# Patient Record
Sex: FEMALE | URBAN - METROPOLITAN AREA
[De-identification: names, ages, dates, MRNs, and addresses within clinical notes are randomized per-mention and may not be internally consistent; named-entity substitution may affect disease eponyms.]

---

## 2021-11-12 ENCOUNTER — OFFICE (OUTPATIENT)
Dept: URBAN - METROPOLITAN AREA CLINIC 75 | Facility: CLINIC | Age: 31
End: 2021-11-12

## 2021-11-12 VITALS
DIASTOLIC BLOOD PRESSURE: 62 MMHG | SYSTOLIC BLOOD PRESSURE: 122 MMHG | WEIGHT: 125 LBS | HEIGHT: 68 IN | HEART RATE: 68 BPM | RESPIRATION RATE: 18 BRPM

## 2021-11-12 DIAGNOSIS — R14.0 ABDOMINAL DISTENSION (GASEOUS): ICD-10-CM

## 2021-11-12 DIAGNOSIS — R10.9 UNSPECIFIED ABDOMINAL PAIN: ICD-10-CM

## 2021-11-12 DIAGNOSIS — E80.4 GILBERT SYNDROME: ICD-10-CM

## 2021-11-12 DIAGNOSIS — E80.7 DISORDER OF BILIRUBIN METABOLISM, UNSPECIFIED: ICD-10-CM

## 2021-11-12 DIAGNOSIS — R68.81 EARLY SATIETY: ICD-10-CM

## 2021-11-12 PROCEDURE — 99244 OFF/OP CNSLTJ NEW/EST MOD 40: CPT | Performed by: INTERNAL MEDICINE

## 2024-09-11 ENCOUNTER — OFFICE VISIT (OUTPATIENT)
Dept: OBSTETRICS AND GYNECOLOGY | Facility: CLINIC | Age: 34
End: 2024-09-11
Payer: COMMERCIAL

## 2024-09-11 VITALS
HEIGHT: 67 IN | DIASTOLIC BLOOD PRESSURE: 72 MMHG | SYSTOLIC BLOOD PRESSURE: 110 MMHG | WEIGHT: 123 LBS | BODY MASS INDEX: 19.3 KG/M2

## 2024-09-11 DIAGNOSIS — Z13.89 SCREENING FOR GENITOURINARY CONDITION: ICD-10-CM

## 2024-09-11 DIAGNOSIS — Z11.51 SCREENING FOR HUMAN PAPILLOMAVIRUS: ICD-10-CM

## 2024-09-11 DIAGNOSIS — Z30.432 ENCOUNTER FOR IUD REMOVAL: ICD-10-CM

## 2024-09-11 DIAGNOSIS — Z01.419 CERVICAL SMEAR, AS PART OF ROUTINE GYNECOLOGICAL EXAMINATION: ICD-10-CM

## 2024-09-11 DIAGNOSIS — Z01.419 WELL WOMAN EXAM WITH ROUTINE GYNECOLOGICAL EXAM: Primary | ICD-10-CM

## 2024-09-11 LAB
B-HCG UR QL: NEGATIVE
BILIRUB BLD-MCNC: NEGATIVE MG/DL
CLARITY, POC: CLEAR
COLOR UR: ABNORMAL
EXPIRATION DATE: NORMAL
GLUCOSE UR STRIP-MCNC: NEGATIVE MG/DL
INTERNAL NEGATIVE CONTROL: NORMAL
INTERNAL POSITIVE CONTROL: NORMAL
KETONES UR QL: NEGATIVE
LEUKOCYTE EST, POC: NEGATIVE
Lab: NORMAL
NITRITE UR-MCNC: NEGATIVE MG/ML
PH UR: 6 [PH] (ref 5–8)
PROT UR STRIP-MCNC: ABNORMAL MG/DL
RBC # UR STRIP: ABNORMAL /UL
SP GR UR: 1 (ref 1–1.03)
UROBILINOGEN UR QL: NORMAL

## 2024-09-11 PROCEDURE — 58301 REMOVE INTRAUTERINE DEVICE: CPT | Performed by: NURSE PRACTITIONER

## 2024-09-11 PROCEDURE — 81025 URINE PREGNANCY TEST: CPT | Performed by: NURSE PRACTITIONER

## 2024-09-11 PROCEDURE — 99385 PREV VISIT NEW AGE 18-39: CPT | Performed by: NURSE PRACTITIONER

## 2024-09-11 PROCEDURE — 81002 URINALYSIS NONAUTO W/O SCOPE: CPT | Performed by: NURSE PRACTITIONER

## 2024-09-17 LAB
CYTOLOGIST CVX/VAG CYTO: NORMAL
CYTOLOGY CVX/VAG DOC CYTO: NORMAL
CYTOLOGY CVX/VAG DOC THIN PREP: NORMAL
DX ICD CODE: NORMAL
HPV I/H RISK 4 DNA CVX QL PROBE+SIG AMP: NEGATIVE
Lab: NORMAL
OTHER STN SPEC: NORMAL
PATHOLOGIST CVX/VAG CYTO: NORMAL
STAT OF ADQ CVX/VAG CYTO-IMP: NORMAL

## 2024-09-23 PROBLEM — Z01.419 WELL WOMAN EXAM WITH ROUTINE GYNECOLOGICAL EXAM: Status: ACTIVE | Noted: 2024-09-23

## 2024-09-23 PROBLEM — Z30.432 ENCOUNTER FOR IUD REMOVAL: Status: ACTIVE | Noted: 2024-09-23

## 2025-03-31 ENCOUNTER — OFFICE VISIT (OUTPATIENT)
Dept: OBSTETRICS AND GYNECOLOGY | Facility: CLINIC | Age: 35
End: 2025-03-31
Payer: COMMERCIAL

## 2025-03-31 VITALS
HEIGHT: 67 IN | DIASTOLIC BLOOD PRESSURE: 64 MMHG | WEIGHT: 130 LBS | BODY MASS INDEX: 20.4 KG/M2 | SYSTOLIC BLOOD PRESSURE: 102 MMHG

## 2025-03-31 DIAGNOSIS — N92.6 MISSED MENSES: ICD-10-CM

## 2025-03-31 DIAGNOSIS — Z32.01 POSITIVE URINE PREGNANCY TEST: Primary | ICD-10-CM

## 2025-03-31 DIAGNOSIS — O20.9 BLEEDING IN EARLY PREGNANCY: ICD-10-CM

## 2025-03-31 DIAGNOSIS — O20.8 SUBCHORIONIC HEMORRHAGE IN FIRST TRIMESTER: ICD-10-CM

## 2025-03-31 LAB
B-HCG UR QL: POSITIVE
BILIRUB BLD-MCNC: NEGATIVE MG/DL
CLARITY, POC: CLEAR
COLOR UR: YELLOW
EXPIRATION DATE: ABNORMAL
GLUCOSE UR STRIP-MCNC: NEGATIVE MG/DL
INTERNAL NEGATIVE CONTROL: ABNORMAL
INTERNAL POSITIVE CONTROL: ABNORMAL
KETONES UR QL: NEGATIVE
LEUKOCYTE EST, POC: NEGATIVE
Lab: ABNORMAL
NITRITE UR-MCNC: NEGATIVE MG/ML
PH UR: 5 [PH] (ref 5–8)
PROT UR STRIP-MCNC: NEGATIVE MG/DL
RBC # UR STRIP: NEGATIVE /UL
SP GR UR: 1.03 (ref 1–1.03)
UROBILINOGEN UR QL: NORMAL

## 2025-03-31 PROCEDURE — 81002 URINALYSIS NONAUTO W/O SCOPE: CPT | Performed by: NURSE PRACTITIONER

## 2025-03-31 PROCEDURE — 81025 URINE PREGNANCY TEST: CPT | Performed by: NURSE PRACTITIONER

## 2025-03-31 PROCEDURE — 99213 OFFICE O/P EST LOW 20 MIN: CPT | Performed by: NURSE PRACTITIONER

## 2025-03-31 RX ORDER — PRENATAL VIT NO.126/IRON/FOLIC 28MG-0.8MG
TABLET ORAL DAILY
COMMUNITY

## 2025-03-31 NOTE — PROGRESS NOTES
Confirmation of pregnancy     Chief Complaint   Patient presents with    Amenorrhea         Nyasia Lwoe is being seen today for evaluation of absence of menses. Due to her period being late, she tested for pregnancy and had + home UPT. She is a 34 y.o. . This problem is new to me, the examiner.       OB History          1    Para   0    Term   0       0    AB   0    Living   0         SAB   0    IAB   0    Ectopic   0    Molar   0    Multiple   0    Live Births   0                LNMP: 25  Confident with date: Yes  Taking prenatal vitamins: Yes. Needs RX: No  Planned pregnancy: Yes  Prior obstetric issues, potential pregnancy concerns:   Family history of genetic issues (includes FOB): denies   Varicella Hx: disease as child  Flu vaccine: did not get   COVID 19 vaccine: S/P vaccine. Booster vaccine: x1   History of STDs: denies   Current medications: PNV   Last pap smear:   Smoker: No  Drug or alcohol abuse: No  H/O physical, emotional or sexual abuse:denies  H/O mental health disorder: denies   Prior testing for Cystic Fibrosis Carrier or Sickle Cell Trait- has not had   Prepregnancy BMI - Body mass index is 20.36 kg/m².    No past medical history on file.    Past Surgical History:   Procedure Laterality Date    KNEE ACL RECONSTRUCTION      WISDOM TOOTH EXTRACTION           Current Outpatient Medications:     prenatal vitamin (prenatal, CLASSIC, vitamin) tablet, Take  by mouth Daily., Disp: , Rfl:     No Known Allergies    Social History     Socioeconomic History    Marital status:    Tobacco Use    Smoking status: Never   Substance and Sexual Activity    Alcohol use: Yes     Alcohol/week: 7.0 standard drinks of alcohol     Types: 2 Glasses of wine, 5 Cans of beer per week    Drug use: Never    Sexual activity: Yes     Partners: Male     Birth control/protection: I.U.D.     Comment: Want to get IUD removed       Family History   Problem Relation Age of Onset    Breast  "cancer Maternal Aunt     Colon cancer Maternal Aunt     Breast cancer Maternal Aunt     Colon cancer Maternal Aunt     Ovarian cancer Neg Hx     Uterine cancer Neg Hx        Review of systems     Review of Systems   Constitutional:  Positive for fatigue.   Genitourinary:  Positive for menstrual problem.   Musculoskeletal:  Positive for back pain.       Objective    /64   Ht 170.2 cm (67\")   Wt 59 kg (130 lb)   LMP 02/16/2025   BMI 20.36 kg/m²     Physical Exam  Vitals and nursing note reviewed.   Constitutional:       Appearance: Normal appearance.   Musculoskeletal:         General: Normal range of motion.   Skin:     General: Skin is warm and dry.   Neurological:      General: No focal deficit present.      Mental Status: She is alert and oriented to person, place, and time.   Psychiatric:         Mood and Affect: Mood normal.         Behavior: Behavior normal.         Assessment/Plan      Missed menses: + UPT in office. LNMP 2/16/25 = 6.1 week EGA with an EDC=11/23/25.  Oriented to practice. US today shows a single, viable IUP @ 6.1 weeks. PIO is seen near the GS measuring 1.91 x 0.82cm. Both ovaries wnl.  bpm.      Pregnancy: Disc importance of regular prenatal care. Enc PNV daily. Counseled on providers and on call phone. Disc Tylenol products are ok and encouraged no ibuprofen or ASA in pregnancy.  Disc exercise in pregnancy, diet, expected weight gain, etc. Enc no use of tobacco, vaping, drugs, or alcohol during pregnancy. Rev warn s/s of SAB.     Labs: Pt counseled on genetic screening, Quad screen, AFP, and NIPS.     Body mass index is 20.36 kg/m². For women with a normal weight, with a BMI between 18.5-24.5   before pregnancy, the recommended weight gain is 25-35 pounds for the entire pregnancy     Smoker- non smoker     6.   COVID19 - S/P vaccine and booster.    7.  Flu vaccine - Declined     8. PIO- Noted on US today. Blood noted on US probe. Check ABO/RH and antibody screen. Enc pelvic " rest. Repeat US next visit     All questions answered.     Encounter Diagnoses   Name Primary?    Missed menses Yes       Diagnoses and all orders for this visit:    Missed menses  -     POC Urinalysis Dipstick  -     POC Pregnancy, Urine    Other orders  -     prenatal vitamin (prenatal, CLASSIC, vitamin) tablet; Take  by mouth Daily.        RTO in 2 weeks for new OB exam, labs and US- [facility]     EMILY Wilkinson  3/31/2025  10:52 EDT

## 2025-04-01 LAB
ABO GROUP BLD: NORMAL
BLD GP AB SCN SERPL QL: NEGATIVE
RH BLD: NORMAL

## 2025-04-02 ENCOUNTER — CLINICAL SUPPORT (OUTPATIENT)
Dept: OBSTETRICS AND GYNECOLOGY | Facility: CLINIC | Age: 35
End: 2025-04-02
Payer: COMMERCIAL

## 2025-04-02 DIAGNOSIS — O26.899 RH NEGATIVE, ANTEPARTUM: Primary | ICD-10-CM

## 2025-04-02 DIAGNOSIS — Z67.91 RH NEGATIVE, ANTEPARTUM: Primary | ICD-10-CM

## 2025-04-14 ENCOUNTER — INITIAL PRENATAL (OUTPATIENT)
Dept: OBSTETRICS AND GYNECOLOGY | Facility: CLINIC | Age: 35
End: 2025-04-14
Payer: COMMERCIAL

## 2025-04-14 VITALS — BODY MASS INDEX: 20.61 KG/M2 | WEIGHT: 131.6 LBS | SYSTOLIC BLOOD PRESSURE: 112 MMHG | DIASTOLIC BLOOD PRESSURE: 60 MMHG

## 2025-04-14 DIAGNOSIS — Z34.00 INITIAL OBSTETRIC VISIT, ANTEPARTUM: Primary | ICD-10-CM

## 2025-04-14 DIAGNOSIS — Z36.9 ENCOUNTER FOR ANTENATAL SCREENING, UNSPECIFIED: ICD-10-CM

## 2025-04-14 DIAGNOSIS — Z67.91 RH D NEGATIVE BLOOD TYPE: ICD-10-CM

## 2025-04-14 DIAGNOSIS — O20.8 SUBCHORIONIC HEMORRHAGE IN FIRST TRIMESTER: ICD-10-CM

## 2025-04-14 PROBLEM — Z30.432 ENCOUNTER FOR IUD REMOVAL: Status: RESOLVED | Noted: 2024-09-23 | Resolved: 2025-04-14

## 2025-04-14 PROBLEM — Z01.419 WELL WOMAN EXAM WITH ROUTINE GYNECOLOGICAL EXAM: Status: RESOLVED | Noted: 2024-09-23 | Resolved: 2025-04-14

## 2025-04-14 LAB
BILIRUB BLD-MCNC: NEGATIVE MG/DL
CLARITY, POC: CLEAR
COLOR UR: YELLOW
GLUCOSE UR STRIP-MCNC: NEGATIVE MG/DL
KETONES UR QL: NEGATIVE
LEUKOCYTE EST, POC: ABNORMAL
NITRITE UR-MCNC: NEGATIVE MG/ML
PH UR: 5 [PH] (ref 5–8)
PROT UR STRIP-MCNC: NEGATIVE MG/DL
RBC # UR STRIP: ABNORMAL /UL
SP GR UR: 1.01 (ref 1–1.03)
UROBILINOGEN UR QL: ABNORMAL

## 2025-04-14 PROCEDURE — 0501F PRENATAL FLOW SHEET: CPT | Performed by: NURSE PRACTITIONER

## 2025-04-14 NOTE — PROGRESS NOTES
Initial OB Visit     Chief Complaint   Patient presents with    Initial Prenatal Visit       Nyasia Lowe is being seen today for her first obstetrical visit.  She is a 34 y.o.    8w1d gestation. This problem is new to me: yes      OB History          1    Para   0    Term   0       0    AB   0    Living   0         SAB   0    IAB   0    Ectopic   0    Molar   0    Multiple   0    Live Births   0                LNMP: 2025  Confident with date: Yes  Taking prenatal vitamins: Yes  Planned pregnancy: Yes  Prior obstetric issues, potential pregnancy concerns: G1  Family history of genetic issues (includes FOB): no  Prior infections concerning in pregnancy (Rash, fever in last 2 weeks): no  Varicella Hx: yes  Flu vaccine: no  COVID Vaccine: yes and booster  History of STDs: no  Current medications: PNV  Last pap smear: 2024  Smoker: No  Drug or alcohol abuse: No  H/O Physical, mental or sexual abuse: no  H/O mental health disorder: no  Prior testing for Cystic Fibrosis Carrier or Sickle Cell Trait- no  Prepregnancy BMI: Body mass index is 20.61 kg/m².      History reviewed. No pertinent past medical history.    Past Surgical History:   Procedure Laterality Date    KNEE ACL RECONSTRUCTION      WISDOM TOOTH EXTRACTION           Current Outpatient Medications:     prenatal vitamin (prenatal, CLASSIC, vitamin) tablet, Take  by mouth Daily., Disp: , Rfl:     No Known Allergies    Social History     Socioeconomic History    Marital status:    Tobacco Use    Smoking status: Never    Smokeless tobacco: Never   Vaping Use    Vaping status: Never Used   Substance and Sexual Activity    Alcohol use: Yes     Alcohol/week: 7.0 standard drinks of alcohol     Types: 2 Glasses of wine, 5 Cans of beer per week    Drug use: Never    Sexual activity: Yes     Partners: Male       Family History   Problem Relation Age of Onset    Breast cancer Maternal Aunt     Colon cancer Maternal Aunt     Breast  cancer Maternal Aunt     Colon cancer Maternal Aunt     Ovarian cancer Neg Hx     Uterine cancer Neg Hx        Review of systems     All systems reviewed and are negative      Objective    /60   Wt 59.7 kg (131 lb 9.6 oz)   LMP 02/16/2025   BMI 20.61 kg/m²     General Appearance:    Alert, cooperative, in no acute distress, habitus normal   Head:    Normocephalic, without obvious abnormality, atraumatic   Neck:   supple   Breast Exam:    Deferred   Abdomen:     Deferred   Genitalia:    Deferred   Extremities:   Moves all extremities well, no edema, no cyanosis, no     redness   Skin:   No bleeding, bruising or rash   Neurologic:   Sensation intact, A&O times 3         Assessment/Plan    1) Pregnancy at 8w1d- US today: FHR 171bpm. OV WNL.  PIO santhosh 1.5x1.2x0.6cm.  US findings discussed with pt/partner. EDC established 11/23/2025 and confirmed by LNMP/US.     2) OB exam: OB exam completed: Yes. New OB bag provided Yes. Pap collected: No; she is UTD. Provided list of safe medications to take while in pregnancy.    3) Labs: OB labs collected: Yes. Counseled on genetic carrier screening (CF/SMA/FX): Yes; she desires.  Counseled on Panorama/NIPS: Yes, she desires at her next appt. Counseled on Quad/AFP: No, she is too early.    4) BMI is within normal parameters. No other follow-up for BMI required.       5)  Prenatal care: Oriented to the office and prenatal care. Encourage prenatal vitamins. Disc Tylenol products are fine, avoid aspirin and ibuprofen; Zika (travel restrictions/ok to use insect repellant); not to change cat litter; food restrictions; exercise; avoidance of alcohol, tobacco, drugs and saunas/hot tubs.     6) S/p Covid vaccine: yes and booster; S/p Flu vaccine: no    7)  PIO- Repeat US today- PIO santhosh 1.5x1.2x0.6cm.  Compared to previous US on 3/31/25- santhosh 1.91x0.82cm.  Continues to have spotting.  Reviewed warning signs.  She is Rh- weak D; received Rhogam on 4/2/2025.  Continue pelvic rest.   Repeat US at next OB visit.        All questions answered.     RTO 4 weeks OBT, Panorama, US-PIO    Parts of this document have been copied or forwarded from her previous visits and have been reviewed, updated and edited as indicated.      Lisa Sanz, APRN    4/14/2025  10:34 EDT

## 2025-04-15 PROBLEM — D64.9: Status: ACTIVE | Noted: 2025-04-15

## 2025-04-15 PROBLEM — D58.2 HIGH BLOOD HEMOGLOBIN F: Status: ACTIVE | Noted: 2025-04-15

## 2025-04-15 LAB
ABO GROUP BLD: NORMAL
BASOPHILS # BLD AUTO: 0 X10E3/UL (ref 0–0.2)
BASOPHILS NFR BLD AUTO: 0 %
BLD GP AB SCN SERPL QL: NORMAL
BLD GP AB SCN SERPL QL: POSITIVE
BLOOD GROUP ANTIBODIES SERPL: ABNORMAL
EOSINOPHIL # BLD AUTO: 0.1 X10E3/UL (ref 0–0.4)
EOSINOPHIL NFR BLD AUTO: 1 %
ERYTHROCYTE [DISTWIDTH] IN BLOOD BY AUTOMATED COUNT: 12.3 % (ref 11.7–15.4)
HBA1C MFR BLD: 4.6 % (ref 4.8–5.6)
HBV SURFACE AG SERPL QL IA: NEGATIVE
HCT VFR BLD AUTO: 39.6 % (ref 34–46.6)
HCV IGG SERPL QL IA: NON REACTIVE
HGB A MFR BLD ELPH: 94.7 % (ref 96.4–98.8)
HGB A2 MFR BLD ELPH: 2.4 % (ref 1.8–3.2)
HGB BLD-MCNC: 13.4 G/DL (ref 11.1–15.9)
HGB F MFR BLD ELPH: 2.9 % (ref 0–2)
HGB FRACT BLD-IMP: ABNORMAL
HGB S MFR BLD ELPH: 0 %
HIV 1+2 AB+HIV1 P24 AG SERPL QL IA: NON REACTIVE
IMM GRANULOCYTES # BLD AUTO: 0 X10E3/UL (ref 0–0.1)
IMM GRANULOCYTES NFR BLD AUTO: 0 %
LYMPHOCYTES # BLD AUTO: 1.5 X10E3/UL (ref 0.7–3.1)
LYMPHOCYTES NFR BLD AUTO: 16 %
MCH RBC QN AUTO: 32.8 PG (ref 26.6–33)
MCHC RBC AUTO-ENTMCNC: 33.8 G/DL (ref 31.5–35.7)
MCV RBC AUTO: 97 FL (ref 79–97)
MONOCYTES # BLD AUTO: 0.5 X10E3/UL (ref 0.1–0.9)
MONOCYTES NFR BLD AUTO: 5 %
NEUTROPHILS # BLD AUTO: 7.1 X10E3/UL (ref 1.4–7)
NEUTROPHILS NFR BLD AUTO: 78 %
PLATELET # BLD AUTO: 229 X10E3/UL (ref 150–450)
RBC # BLD AUTO: 4.08 X10E6/UL (ref 3.77–5.28)
RH BLD: NORMAL
RPR SER QL: NON REACTIVE
RUBV IGG SERPL IA-ACNC: 1.2 INDEX
VZV IGG SER QL IA: REACTIVE
WBC # BLD AUTO: 9.3 X10E3/UL (ref 3.4–10.8)
XXX BLOOD GROUP AB TITR SERPL AHG: ABNORMAL {TITER}

## 2025-04-16 LAB
AMPHETAMINES UR QL SCN: NEGATIVE NG/ML
BACTERIA UR CULT: NORMAL
BACTERIA UR CULT: NORMAL
BARBITURATES UR QL SCN: NEGATIVE NG/ML
BENZODIAZ UR QL SCN: NEGATIVE NG/ML
BUPRENORPHINE UR QL: NEGATIVE NG/ML
BZE UR QL SCN: NEGATIVE NG/ML
C TRACH RRNA SPEC QL NAA+PROBE: NEGATIVE
CANNABINOIDS UR QL SCN: NEGATIVE NG/ML
CREAT UR-MCNC: 13.3 MG/DL (ref 20–300)
FENTANYL UR-MCNC: NEGATIVE PG/ML
LABORATORY COMMENT REPORT: ABNORMAL
MEPERIDINE UR QL: NEGATIVE NG/ML
METHADONE UR QL SCN: NEGATIVE NG/ML
N GONORRHOEA RRNA SPEC QL NAA+PROBE: NEGATIVE
OPIATES UR QL SCN: NEGATIVE NG/ML
OXYCODONE+OXYMORPHONE UR QL SCN: NEGATIVE NG/ML
PCP UR QL: NEGATIVE NG/ML
PH UR: 6.4 [PH] (ref 4.5–8.9)
PROPOXYPH UR QL SCN: NEGATIVE NG/ML
SP GR UR: 1
T VAGINALIS RRNA SPEC QL NAA+PROBE: NEGATIVE
TRAMADOL UR QL SCN: NEGATIVE NG/ML

## 2025-04-24 LAB
Lab: NEGATIVE
Lab: NORMAL
NTRA CYSTIC FIBROSIS: NEGATIVE
NTRA FRAGILE X SYNDROME: NEGATIVE
NTRA SPINAL MUSCULAR ATROPHY: NEGATIVE

## 2025-05-13 ENCOUNTER — ROUTINE PRENATAL (OUTPATIENT)
Dept: OBSTETRICS AND GYNECOLOGY | Facility: CLINIC | Age: 35
End: 2025-05-13
Payer: COMMERCIAL

## 2025-05-13 VITALS — DIASTOLIC BLOOD PRESSURE: 64 MMHG | BODY MASS INDEX: 20.99 KG/M2 | WEIGHT: 134 LBS | SYSTOLIC BLOOD PRESSURE: 108 MMHG

## 2025-05-13 DIAGNOSIS — Z71.85 IMMUNIZATION COUNSELING: ICD-10-CM

## 2025-05-13 DIAGNOSIS — D64.9 LOW BLOOD HEMOGLOBIN A: ICD-10-CM

## 2025-05-13 DIAGNOSIS — Z67.91 RH D NEGATIVE BLOOD TYPE: ICD-10-CM

## 2025-05-13 DIAGNOSIS — D58.2 HIGH BLOOD HEMOGLOBIN F: ICD-10-CM

## 2025-05-13 DIAGNOSIS — O20.8 SUBCHORIONIC HEMORRHAGE IN FIRST TRIMESTER: ICD-10-CM

## 2025-05-13 DIAGNOSIS — O20.9 BLEEDING IN EARLY PREGNANCY: Primary | ICD-10-CM

## 2025-05-13 DIAGNOSIS — Z36.9 ENCOUNTER FOR ANTENATAL SCREENING, UNSPECIFIED: ICD-10-CM

## 2025-05-13 PROBLEM — Z34.90 PREGNANCY: Status: ACTIVE | Noted: 2025-05-13

## 2025-05-13 PROBLEM — Z32.01 POSITIVE URINE PREGNANCY TEST: Status: RESOLVED | Noted: 2025-03-31 | Resolved: 2025-05-13

## 2025-05-13 LAB
BILIRUB BLD-MCNC: NEGATIVE MG/DL
CLARITY, POC: CLEAR
COLOR UR: YELLOW
GLUCOSE UR STRIP-MCNC: NEGATIVE MG/DL
KETONES UR QL: NEGATIVE
LEUKOCYTE EST, POC: NEGATIVE
NITRITE UR-MCNC: NEGATIVE MG/ML
PH UR: 5 [PH] (ref 5–8)
PROT UR STRIP-MCNC: NEGATIVE MG/DL
RBC # UR STRIP: NEGATIVE /UL
SP GR UR: 1.03 (ref 1–1.03)
UROBILINOGEN UR QL: NORMAL

## 2025-05-13 NOTE — PROGRESS NOTES
Ob follow up      Nyasia Lowe is a 34 y.o.  12w2d patient being seen today for her obstetrical visit. Patient reports no complaints. No VB or spotting.  Fetal movement:  Not yet  .      ROS - Denies leaking fluid, vaginal bleeding and notes good fetal movement.     /64   Wt 60.8 kg (134 lb)   LMP 2025   BMI 20.99 kg/m²       Vitals: VSS; AF    General Appearance:  Awake. Alert. Well developed. Well nourished. In no acute distress.    Visual Inspection: ° Abdomen was normal on visual inspection.  Palpation: ° Abdomen was soft. ° Abdominal non-tender.    Uterus: ° Fundal height was normal for gestational age. ° Not tender.  Uterine Adnexae: ° Normal without masses or tenderness.  Neurological:  ° Oriented to time, place, and person.  Skin:  ° General appearance was normal. No bruising or ecchymosis.  Obstetrical: +FCA on US     Viable IUP 13+5 wga     FCA: 150's   PIO seen 1.66 x 0.55 cm           Ultrasound images and report reviewed personally by me.          Dameon Layne, DO    A/P      Diagnoses and all orders for this visit:    1. Bleeding in early pregnancy (Primary)    2. Subchorionic hemorrhage in first trimester    3. Low blood hemoglobin A- referred to hematology    4. High blood hemoglobin F- referred to hematology  Overview:  Heme Appt [id number]      5. Rh D negative blood type- weak D  Overview:  Rhogam 28 wga ( )     Females of child bearing potential   with unknown weak D genotype are candidates for Rh immune globulin   administration (Obstet Gynecol. 2017 Aug;130 )      6. Immunization counseling  Overview:  S/p Covid vaccine: yes and booster; S/p Flu vaccine: no  Tdap 27-37 wga ( )       7. Encounter for  screening, unspecified  -     POC Urinalysis Dipstick      F/u 4 weeks   OB, US ( [facility]) AFP      I confirm that all copied/forwarded documentation in this record has been carefully reviewed, updated as necessary, and accurately reflects the patient's current status  and plan of care.        Dameon Layne, DO     5/13/2025  08:58 EDT

## 2025-05-14 ENCOUNTER — CONSULT (OUTPATIENT)
Dept: ONCOLOGY | Facility: CLINIC | Age: 35
End: 2025-05-14
Payer: COMMERCIAL

## 2025-05-14 ENCOUNTER — LAB (OUTPATIENT)
Dept: LAB | Facility: HOSPITAL | Age: 35
End: 2025-05-14
Payer: COMMERCIAL

## 2025-05-14 ENCOUNTER — PATIENT ROUNDING (BHMG ONLY) (OUTPATIENT)
Dept: ONCOLOGY | Facility: CLINIC | Age: 35
End: 2025-05-14
Payer: COMMERCIAL

## 2025-05-14 VITALS
OXYGEN SATURATION: 100 % | SYSTOLIC BLOOD PRESSURE: 105 MMHG | HEIGHT: 67 IN | DIASTOLIC BLOOD PRESSURE: 69 MMHG | BODY MASS INDEX: 21.25 KG/M2 | RESPIRATION RATE: 16 BRPM | HEART RATE: 71 BPM | WEIGHT: 135.4 LBS | TEMPERATURE: 98.3 F

## 2025-05-14 DIAGNOSIS — D58.2 HIGH BLOOD HEMOGLOBIN F: Primary | ICD-10-CM

## 2025-05-14 DIAGNOSIS — D64.9 LOW BLOOD HEMOGLOBIN A: Primary | ICD-10-CM

## 2025-05-14 LAB
BASOPHILS # BLD AUTO: 0.02 10*3/MM3 (ref 0–0.2)
BASOPHILS NFR BLD AUTO: 0.2 % (ref 0–1.5)
DEPRECATED RDW RBC AUTO: 42.3 FL (ref 37–54)
EOSINOPHIL # BLD AUTO: 0.11 10*3/MM3 (ref 0–0.4)
EOSINOPHIL NFR BLD AUTO: 1 % (ref 0.3–6.2)
ERYTHROCYTE [DISTWIDTH] IN BLOOD BY AUTOMATED COUNT: 12.3 % (ref 12.3–15.4)
HCT VFR BLD AUTO: 37.5 % (ref 34–46.6)
HGB BLD-MCNC: 13.3 G/DL (ref 12–15.9)
IMM GRANULOCYTES # BLD AUTO: 0.03 10*3/MM3 (ref 0–0.05)
IMM GRANULOCYTES NFR BLD AUTO: 0.3 % (ref 0–0.5)
LYMPHOCYTES # BLD AUTO: 1.57 10*3/MM3 (ref 0.7–3.1)
LYMPHOCYTES NFR BLD AUTO: 14.3 % (ref 19.6–45.3)
MCH RBC QN AUTO: 33.1 PG (ref 26.6–33)
MCHC RBC AUTO-ENTMCNC: 35.5 G/DL (ref 31.5–35.7)
MCV RBC AUTO: 93.3 FL (ref 79–97)
MONOCYTES # BLD AUTO: 0.56 10*3/MM3 (ref 0.1–0.9)
MONOCYTES NFR BLD AUTO: 5.1 % (ref 5–12)
NEUTROPHILS NFR BLD AUTO: 79.1 % (ref 42.7–76)
NEUTROPHILS NFR BLD AUTO: 8.71 10*3/MM3 (ref 1.7–7)
PLATELET # BLD AUTO: 191 10*3/MM3 (ref 140–450)
PMV BLD AUTO: 9.8 FL (ref 6–12)
RBC # BLD AUTO: 4.02 10*6/MM3 (ref 3.77–5.28)
WBC NRBC COR # BLD AUTO: 11 10*3/MM3 (ref 3.4–10.8)

## 2025-05-14 PROCEDURE — 85025 COMPLETE CBC W/AUTO DIFF WBC: CPT | Performed by: INTERNAL MEDICINE

## 2025-05-14 NOTE — PROGRESS NOTES
Subjective     REASON FOR CONSULTATION: Abnormal hemoglobin electrophoresis  Provide an opinion on any further workup or treatment                             REQUESTING PRACTITIONER:  Yvon    RECORDS OBTAINED:  Records of the patients history including those obtained from the referring provider were reviewed and summarized in detail.    HISTORY OF PRESENT ILLNESS:  The patient is a 34 y.o. year old female who is here for an opinion about the above issue.    History of Present Illness   This is a pleasant 34-year-old lady currently 12+ weeks gestation with her first pregnancy referred from OB for review of an abnormal hemoglobin electrophoresis.  The patient denies history of anemia, family history of anemia or personal/family history of transfusion support needs except for an elderly grandfather.    Patient had a hemoglobin electrophoresis performed as part of her routine pregnancy care on 4/14/2025 which showed mild elevation of hemoglobin F2 0.9% with subsequent mild decreased hemoglobin A 96.4% normal hemoglobin A 2 2.4% no hemoglobin S.    Review of the patient's CBCs show long-term normal hemoglobin and Red cell indices.    Past Medical History:   Diagnosis Date    Asthma     Depression         Past Surgical History:   Procedure Laterality Date    KNEE ACL RECONSTRUCTION      WISDOM TOOTH EXTRACTION  2009        Current Outpatient Medications on File Prior to Visit   Medication Sig Dispense Refill    prenatal vitamin (prenatal, CLASSIC, vitamin) tablet Take  by mouth Daily.       No current facility-administered medications on file prior to visit.        ALLERGIES:  No Known Allergies     Social History     Socioeconomic History    Marital status:    Tobacco Use    Smoking status: Never    Smokeless tobacco: Never   Vaping Use    Vaping status: Never Used   Substance and Sexual Activity    Alcohol use: Yes     Alcohol/week: 7.0 standard drinks of alcohol     Types: 2 Glasses of wine, 5 Cans of beer  "per week    Drug use: Never    Sexual activity: Yes     Partners: Male        Family History   Problem Relation Age of Onset    Heart disease Father     Arthritis Father     Breast cancer Maternal Aunt     Colon cancer Maternal Aunt     Breast cancer Maternal Aunt     Colon cancer Maternal Aunt     Colon cancer Paternal Grandfather     Ovarian cancer Neg Hx     Uterine cancer Neg Hx         Review of Systems   Constitutional: Negative.    HENT: Negative.     Respiratory: Negative.     Cardiovascular: Negative.    Gastrointestinal: Negative.    Genitourinary: Negative.    Musculoskeletal: Negative.    Allergic/Immunologic: Negative.    Neurological: Negative.    Hematological: Negative.    Psychiatric/Behavioral: Negative.            Objective     Vitals:    05/14/25 1110   BP: 105/69   Pulse: 71   Resp: 16   Temp: 98.3 °F (36.8 °C)   TempSrc: Infrared   SpO2: 100%   Weight: 61.4 kg (135 lb 6.4 oz)   Height: 170.2 cm (67.01\")   PainSc: 0-No pain         5/14/2025    11:28 AM   Current Status   ECOG score 0       Physical Exam    CONSTITUTIONAL: pleasant well-developed adult woman  HEENT: no icterus, no thrush, moist membranes  CV: RRR, S1S2, no murmur  RESP: cta bilat, no wheezing, no rales  GI: soft, nontender, no splenomegaly, +BS  MUSC: no edema, normal gait  NEURO: alert and oriented x3, normal strength  PSYCH: normal mood and affect     RECENT LABS:  Hematology WBC   Date Value Ref Range Status   05/14/2025 11.00 (H) 3.40 - 10.80 10*3/mm3 Final   04/14/2025 9.3 3.4 - 10.8 x10E3/uL Final   02/17/2023 5.91 4.5 - 11.0 10*3/uL Final     RBC   Date Value Ref Range Status   05/14/2025 4.02 3.77 - 5.28 10*6/mm3 Final   04/14/2025 4.08 3.77 - 5.28 x10E6/uL Final   02/17/2023 4.08 4.0 - 5.2 10*6/uL Final     Hemoglobin   Date Value Ref Range Status   05/14/2025 13.3 12.0 - 15.9 g/dL Final   02/17/2023 13.4 12.0 - 16.0 g/dL Final     Hematocrit   Date Value Ref Range Status   05/14/2025 37.5 34.0 - 46.6 % Final "   02/17/2023 38.5 36.0 - 46.0 % Final     Platelets   Date Value Ref Range Status   05/14/2025 191 140 - 450 10*3/mm3 Final   02/17/2023 197 140 - 440 10*3/uL Final          Assessment & Plan     This is a pleasant 34-year-old woman who has minimally elevated hemoglobin F2 0.9% with subsequent mild decrease in the hemoglobin a 94.7% normal hemoglobin A2 and hemoglobin S.  She has no anemia and normocytic normochromic red blood cells.  She has never required blood transfusion.  She has no known family history of hemoglobinopathies.  I discussed with the patient this most likely represents persistence of the hemoglobin after gene or a side effect of her pregnancy, suspect the first.  This typically has no clinical consequence and the patient was reassured.  I will see her back in hematology as needed.

## 2025-05-17 LAB
CFDNA.FET/CFDNA.TOTAL SFR FETUS: NORMAL %
CITATION REF LAB TEST: NORMAL
FET 13+18+21+X+Y ANEUP PLAS.CFDNA: NEGATIVE
FET CHR 21 TS PLAS.CFDNA QL: NEGATIVE
FET SEX PLAS.CFDNA DOSAGE CFDNA: NORMAL
FET TS 13 RISK PLAS.CFDNA QL: NEGATIVE
FET TS 18 RISK WBC.DNA+CFDNA QL: NEGATIVE
GA EST FROM CONCEPTION DATE: NORMAL D
GESTATIONAL AGE > 9:: YES
LAB DIRECTOR NAME PROVIDER: NORMAL
LAB DIRECTOR NAME PROVIDER: NORMAL
LABORATORY COMMENT REPORT: NORMAL
LIMITATIONS OF THE TEST: NORMAL
NEGATIVE PREDICTIVE VALUE: NORMAL
PERFORMANCE CHARACTERISTICS: NORMAL
POSITIVE PREDICTIVE VALUE: NORMAL
REF LAB TEST METHOD: NORMAL
SERVICE CMNT-IMP: NORMAL
TEST PERFORMANCE INFO SPEC: NORMAL

## 2025-06-09 ENCOUNTER — ROUTINE PRENATAL (OUTPATIENT)
Dept: OBSTETRICS AND GYNECOLOGY | Facility: CLINIC | Age: 35
End: 2025-06-09
Payer: COMMERCIAL

## 2025-06-09 VITALS — DIASTOLIC BLOOD PRESSURE: 62 MMHG | WEIGHT: 139 LBS | SYSTOLIC BLOOD PRESSURE: 102 MMHG | BODY MASS INDEX: 21.77 KG/M2

## 2025-06-09 DIAGNOSIS — O43.119 ANTEPARTUM PLACENTA CIRCUMVALLATA: ICD-10-CM

## 2025-06-09 DIAGNOSIS — Z71.85 IMMUNIZATION COUNSELING: ICD-10-CM

## 2025-06-09 DIAGNOSIS — D64.9 LOW BLOOD HEMOGLOBIN A: ICD-10-CM

## 2025-06-09 DIAGNOSIS — O20.8 SUBCHORIONIC HEMORRHAGE IN FIRST TRIMESTER: ICD-10-CM

## 2025-06-09 DIAGNOSIS — Z3A.16 16 WEEKS GESTATION OF PREGNANCY: ICD-10-CM

## 2025-06-09 DIAGNOSIS — Z67.91 RH D NEGATIVE BLOOD TYPE: ICD-10-CM

## 2025-06-09 DIAGNOSIS — Z36.9 ENCOUNTER FOR ANTENATAL SCREENING, UNSPECIFIED: Primary | ICD-10-CM

## 2025-06-09 DIAGNOSIS — D58.2 HIGH BLOOD HEMOGLOBIN F: ICD-10-CM

## 2025-06-09 LAB
BILIRUB BLD-MCNC: NEGATIVE MG/DL
CLARITY, POC: CLEAR
COLOR UR: NORMAL
GLUCOSE UR STRIP-MCNC: NEGATIVE MG/DL
KETONES UR QL: NEGATIVE
LEUKOCYTE EST, POC: NEGATIVE
NITRITE UR-MCNC: NEGATIVE MG/ML
PH UR: 5 [PH] (ref 5–8)
PROT UR STRIP-MCNC: NEGATIVE MG/DL
RBC # UR STRIP: NEGATIVE /UL
SP GR UR: 1.02 (ref 1–1.03)
UROBILINOGEN UR QL: NORMAL

## 2025-06-09 NOTE — PROGRESS NOTES
Ob follow up      Nyasia Lowe is a 34 y.o.  16w1d patient being seen today for her obstetrical visit. Patient reports no complaints. Fetal movement: normal.      ROS - Denies leaking fluid, vaginal bleeding and notes good fetal movement.     /62   Wt 63 kg (139 lb)   LMP 2025   BMI 21.77 kg/m²       Vitals: VSS; AF    General Appearance:  Awake. Alert. Well developed. Well nourished. In no acute distress.    Visual Inspection: ° Abdomen was normal on visual inspection.  Palpation: ° Abdomen was soft. ° Abdominal non-tender.    Uterus: ° Fundal height was normal for gestational age. ° Not tender.  Uterine Adnexae: ° Normal without masses or tenderness.  Neurological:  ° Oriented to time, place, and person.  Skin:  ° General appearance was normal. No bruising or ecchymosis.  Obstetrical: +FCA     Presentation: Transverse   Placenta: Posterior ( Circumvallate placenta? ) PIO absent    MVP 5.4 cm   FCA: 140's            Ultrasound images and report reviewed personally by me.          Dameon Layne, DO     A/P      Diagnoses and all orders for this visit:    1. Encounter for  screening, unspecified (Primary)  -     POC Urinalysis Dipstick  -     Alpha Fetoprotein, Maternal    2. Subchorionic hemorrhage RESOLVED    3. 16 weeks gestation of pregnancy  Overview:  CF/FX/SMA neg   NIPT Low risk; Male   AFP pending       4. Low blood hemoglobin A- referred to hematology    5. High blood hemoglobin F- referred to hematology  Overview:  Heme Appt     Heme:   minimally elevated hemoglobin F2 0.9% with subsequent mild decrease in the hemoglobin a 94.7% normal hemoglobin A2 and hemoglobin S.   She has no anemia and normocytic normochromic red blood cells. She has never required blood transfusion.   She has no known family history of hemoglobinopathies. I discussed with the patient this most likely represents persistence of the hemoglobin after gene or a side effect of her pregnancy, suspect  the first.   This typically has no clinical consequence and the patient was reassured. ( May 2025)       6. Rh D negative blood type- weak D  Overview:  Rhogam 28 wga ( )     Females of child bearing potential   with unknown weak D genotype are candidates for Rh immune globulin   administration (Obstet Gynecol. 2017 Aug;130 )      7. Immunization counseling  Overview:  S/p Covid vaccine: yes and booster; S/p Flu vaccine: no  Tdap 27-37 wga ( )       8. Antepartum placenta circumvallata  Overview:  Growth 28, 32, 36 wga ( )         F/u 4 weeks, OB , Anatomy     I confirm that all copied/forwarded documentation in this record has been carefully reviewed, updated as necessary, and accurately reflects the patient's current status and plan of care.        Dameon Layne DO     6/9/2025  09:16 EDT

## 2025-06-11 LAB
AFP INTERP SERPL-IMP: NORMAL
AFP INTERP SERPL-IMP: NORMAL
AFP MOM SERPL: 1.15
AFP SERPL-MCNC: 40.5 NG/ML
AGE AT DELIVERY: 34.1 YR
GA METHOD: NORMAL
GA: 16.1 WEEKS
IDDM PATIENT QL: NO
LABORATORY COMMENT REPORT: NORMAL
MULTIPLE PREGNANCY: NO
NEURAL TUBE DEFECT RISK FETUS: 7603 %
RESULT: NORMAL

## 2025-07-08 ENCOUNTER — ROUTINE PRENATAL (OUTPATIENT)
Dept: OBSTETRICS AND GYNECOLOGY | Facility: CLINIC | Age: 35
End: 2025-07-08
Payer: COMMERCIAL

## 2025-07-08 VITALS — SYSTOLIC BLOOD PRESSURE: 102 MMHG | BODY MASS INDEX: 21.77 KG/M2 | WEIGHT: 139 LBS | DIASTOLIC BLOOD PRESSURE: 62 MMHG

## 2025-07-08 DIAGNOSIS — D58.2 HIGH BLOOD HEMOGLOBIN F: ICD-10-CM

## 2025-07-08 DIAGNOSIS — Z3A.20 20 WEEKS GESTATION OF PREGNANCY: ICD-10-CM

## 2025-07-08 DIAGNOSIS — Z36.9 ENCOUNTER FOR ANTENATAL SCREENING, UNSPECIFIED: Primary | ICD-10-CM

## 2025-07-08 DIAGNOSIS — D64.9 LOW BLOOD HEMOGLOBIN A: ICD-10-CM

## 2025-07-08 DIAGNOSIS — Z67.91 RH D NEGATIVE BLOOD TYPE: ICD-10-CM

## 2025-07-08 DIAGNOSIS — O20.8 SUBCHORIONIC HEMORRHAGE IN FIRST TRIMESTER: ICD-10-CM

## 2025-07-08 DIAGNOSIS — Z71.85 IMMUNIZATION COUNSELING: ICD-10-CM

## 2025-07-08 PROBLEM — O20.9 BLEEDING IN EARLY PREGNANCY: Status: RESOLVED | Noted: 2025-03-31 | Resolved: 2025-07-08

## 2025-07-08 PROBLEM — O43.119 CIRCUMVALLATE PLACENTA: Status: RESOLVED | Noted: 2025-06-09 | Resolved: 2025-07-08

## 2025-07-08 LAB
BILIRUB BLD-MCNC: NEGATIVE MG/DL
CLARITY, POC: CLEAR
COLOR UR: YELLOW
GLUCOSE UR STRIP-MCNC: NEGATIVE MG/DL
KETONES UR QL: ABNORMAL
LEUKOCYTE EST, POC: NEGATIVE
NITRITE UR-MCNC: NEGATIVE MG/ML
PH UR: 5 [PH] (ref 5–8)
PROT UR STRIP-MCNC: ABNORMAL MG/DL
RBC # UR STRIP: ABNORMAL /UL
SP GR UR: 1.01 (ref 1–1.03)
UROBILINOGEN UR QL: NORMAL

## 2025-07-08 NOTE — PROGRESS NOTES
Ob follow up      Nyasia Lowe is a 34 y.o.  20w2d patient being seen today for her obstetrical visit. Patient reports asks about placenta . Fetal movement: normal.      ROS - Denies leaking fluid, vaginal bleeding and notes good fetal movement.     /62   Wt 63 kg (139 lb)   LMP 2025   BMI 21.77 kg/m²       Vitals: VSS; AF    General Appearance:  Awake. Alert. Well developed. Well nourished. In no acute distress.    Visual Inspection: ° Abdomen was normal on visual inspection.  Palpation: ° Abdomen was soft. ° Abdominal non-tender.    Uterus: ° Fundal height was normal for gestational age. ° Not tender.  Uterine Adnexae: ° Normal without masses or tenderness.  Neurological:  ° Oriented to time, place, and person.  Skin:  ° General appearance was normal. No bruising or ecchymosis.  Obstetrical:    Presentation: Breech  Placenta: Posterior  MVP 4.2 cm   FCA: 150's  Cervical length: 4.1 cm   Anatomy scan normal         Ultrasound images and report reviewed personally by me.          Dameon Layne, DO     A/P      Diagnoses and all orders for this visit:    1. Encounter for  screening, unspecified (Primary)  -     POC Urinalysis Dipstick    2. Subchorionic hemorrhage RESOLVED    3. 20 weeks gestation of pregnancy  Overview:  CF/FX/SMA neg   NIPT Low risk; Male   AFP pending       4. Low blood hemoglobin A- referred to hematology    5. High blood hemoglobin F- referred to hematology  Overview:  Heme Appt     Heme:   minimally elevated hemoglobin F2 0.9% with subsequent mild decrease in the hemoglobin a 94.7% normal hemoglobin A2 and hemoglobin S.   She has no anemia and normocytic normochromic red blood cells. She has never required blood transfusion.   She has no known family history of hemoglobinopathies. I discussed with the patient this most likely represents persistence of the hemoglobin after gene or a side effect of her pregnancy, suspect the first.   This typically has no  clinical consequence and the patient was reassured. ( May 2025)       6. Rh D negative blood type- weak D  Overview:  Rhogam 28 wga ( )     Females of child bearing potential   with unknown weak D genotype are candidates for Rh immune globulin   administration (Obstet Gynecol. 2017 Aug;130 )      7. Immunization counseling  Overview:  S/p Covid vaccine: yes and booster; S/p Flu vaccine: no  Tdap 27-37 wga ( )             I confirm that all copied/forwarded documentation in this record has been carefully reviewed, updated as necessary, and accurately reflects the patient's current status and plan of care.        Dameon Layne DO     7/8/2025  08:58 EDT

## 2025-08-06 ENCOUNTER — ROUTINE PRENATAL (OUTPATIENT)
Dept: OBSTETRICS AND GYNECOLOGY | Facility: CLINIC | Age: 35
End: 2025-08-06
Payer: COMMERCIAL

## 2025-08-06 VITALS — DIASTOLIC BLOOD PRESSURE: 60 MMHG | BODY MASS INDEX: 22.39 KG/M2 | WEIGHT: 143 LBS | SYSTOLIC BLOOD PRESSURE: 100 MMHG

## 2025-08-06 DIAGNOSIS — Z36.9 ENCOUNTER FOR ANTENATAL SCREENING, UNSPECIFIED: ICD-10-CM

## 2025-08-06 DIAGNOSIS — Z67.91 RH D NEGATIVE BLOOD TYPE: ICD-10-CM

## 2025-08-06 DIAGNOSIS — Z3A.24 24 WEEKS GESTATION OF PREGNANCY: Primary | ICD-10-CM

## 2025-08-06 LAB
BILIRUB BLD-MCNC: NEGATIVE MG/DL
CLARITY, POC: CLEAR
COLOR UR: YELLOW
GLUCOSE UR STRIP-MCNC: NEGATIVE MG/DL
KETONES UR QL: NEGATIVE
LEUKOCYTE EST, POC: NEGATIVE
NITRITE UR-MCNC: NEGATIVE MG/ML
PH UR: 5 [PH] (ref 5–8)
PROT UR STRIP-MCNC: NEGATIVE MG/DL
RBC # UR STRIP: NEGATIVE /UL
SP GR UR: 1 (ref 1–1.03)
UROBILINOGEN UR QL: NORMAL